# Patient Record
Sex: MALE | Race: WHITE
[De-identification: names, ages, dates, MRNs, and addresses within clinical notes are randomized per-mention and may not be internally consistent; named-entity substitution may affect disease eponyms.]

---

## 2019-06-06 ENCOUNTER — HOSPITAL ENCOUNTER (EMERGENCY)
Dept: HOSPITAL 54 - ER | Age: 34
Discharge: HOME | End: 2019-06-06
Payer: SELF-PAY

## 2019-06-06 VITALS — WEIGHT: 205 LBS | HEIGHT: 74 IN | BODY MASS INDEX: 26.31 KG/M2

## 2019-06-06 VITALS — DIASTOLIC BLOOD PRESSURE: 66 MMHG | SYSTOLIC BLOOD PRESSURE: 127 MMHG
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
## 2019-06-06 DIAGNOSIS — T40.1X1A: Primary | ICD-10-CM

## 2019-09-18 ENCOUNTER — HOSPITAL ENCOUNTER (EMERGENCY)
Dept: HOSPITAL 54 - ER | Age: 34
Discharge: HOME | End: 2019-09-18
Payer: SELF-PAY

## 2019-09-18 VITALS — HEIGHT: 74 IN | BODY MASS INDEX: 26.31 KG/M2 | WEIGHT: 205 LBS

## 2019-09-18 VITALS — SYSTOLIC BLOOD PRESSURE: 140 MMHG | DIASTOLIC BLOOD PRESSURE: 58 MMHG

## 2019-09-18 DIAGNOSIS — T41.295A: Primary | ICD-10-CM
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
## 2019-09-18 LAB
ALBUMIN SERPL BCP-MCNC: 4.5 G/DL (ref 3.4–5)
ALP SERPL-CCNC: 84 U/L (ref 46–116)
ALT SERPL W P-5'-P-CCNC: 22 U/L (ref 12–78)
APAP SERPL-MCNC: 0 UG/ML (ref 10–30)
AST SERPL W P-5'-P-CCNC: 16 U/L (ref 15–37)
BASOPHILS # BLD AUTO: 0.1 /CMM (ref 0–0.2)
BASOPHILS NFR BLD AUTO: 0.6 % (ref 0–2)
BILIRUB DIRECT SERPL-MCNC: 0.1 MG/DL (ref 0–0.2)
BILIRUB SERPL-MCNC: 0.3 MG/DL (ref 0.2–1)
BUN SERPL-MCNC: 13 MG/DL (ref 7–18)
CALCIUM SERPL-MCNC: 9.9 MG/DL (ref 8.5–10.1)
CHLORIDE SERPL-SCNC: 103 MMOL/L (ref 98–107)
CO2 SERPL-SCNC: 30 MMOL/L (ref 21–32)
CREAT SERPL-MCNC: 0.9 MG/DL (ref 0.6–1.3)
EOSINOPHIL NFR BLD AUTO: 1.9 % (ref 0–6)
ETHANOL SERPL-MCNC: < 3 MG/DL (ref 0–0)
GLUCOSE SERPL-MCNC: 97 MG/DL (ref 74–106)
HCT VFR BLD AUTO: 45 % (ref 39–51)
HGB BLD-MCNC: 15.2 G/DL (ref 13.5–17.5)
LYMPHOCYTES NFR BLD AUTO: 16.2 % (ref 20–44)
LYMPHOCYTES NFR BLD AUTO: 2 /CMM (ref 0.8–4.8)
MCHC RBC AUTO-ENTMCNC: 34 G/DL (ref 31–36)
MCV RBC AUTO: 87 FL (ref 80–96)
MONOCYTES NFR BLD AUTO: 0.9 /CMM (ref 0.1–1.3)
MONOCYTES NFR BLD AUTO: 7.2 % (ref 2–12)
NEUTROPHILS # BLD AUTO: 9.1 /CMM (ref 1.8–8.9)
NEUTROPHILS NFR BLD AUTO: 74.1 % (ref 43–81)
PLATELET # BLD AUTO: 243 /CMM (ref 150–450)
POTASSIUM SERPL-SCNC: 5 MMOL/L (ref 3.5–5.1)
PROT SERPL-MCNC: 8.4 G/DL (ref 6.4–8.2)
RBC # BLD AUTO: 5.13 MIL/UL (ref 4.5–6)
SALICYLATES SERPL-MCNC: 1.9 MG/DL (ref 2.8–20)
SODIUM SERPL-SCNC: 141 MMOL/L (ref 136–145)
WBC NRBC COR # BLD AUTO: 12.3 K/UL (ref 4.3–11)

## 2019-09-18 PROCEDURE — 80305 DRUG TEST PRSMV DIR OPT OBS: CPT

## 2019-09-18 PROCEDURE — 36415 COLL VENOUS BLD VENIPUNCTURE: CPT

## 2019-09-18 PROCEDURE — 80076 HEPATIC FUNCTION PANEL: CPT

## 2019-09-18 PROCEDURE — 80329 ANALGESICS NON-OPIOID 1 OR 2: CPT

## 2019-09-18 PROCEDURE — 99283 EMERGENCY DEPT VISIT LOW MDM: CPT

## 2019-09-18 PROCEDURE — 85025 COMPLETE CBC W/AUTO DIFF WBC: CPT

## 2019-09-18 PROCEDURE — G0480 DRUG TEST DEF 1-7 CLASSES: HCPCS

## 2019-09-18 PROCEDURE — 80048 BASIC METABOLIC PNL TOTAL CA: CPT

## 2019-09-18 PROCEDURE — 80307 DRUG TEST PRSMV CHEM ANLYZR: CPT

## 2019-09-18 NOTE — NUR
SOURAV FROM HOME. TO ER BED 10. LETHARGIC, DROWSY BUT EASILY ARROUSABLE WITH 
VERBAL STIMULI. BROUGHT IN FOR OVERDOSE/INGESTION OF GHB. PT REPORTS THAT HE 
TOOK 2 DOSE. SISTER AT BEDSIDE  AND REPORTS THAT HE TOOK COCOAINE AND GHB. PT 
IS PRESENTED BRADYCARDIC IN THE 40S. DEEP SLOW BREATHING WITH SAT FLACTUATING 
WHEN FALLING ASLEEP. PT IS CURRENTLY ON 6LPM O2 VIA SIMPLE MASK SATTING @97%. 
FAMILY AT BEDSIDE HELPING TO KEEP PATIENT AWAKE AND BREATHING. MD WAS AT 
BEDSIDE FOR EVAL. ORDERS RECEIVED, NOTEE AND CARRIED OUT. PT CAME IN WITH IV 
LINE ON R AC 18. BLOOD RAWN AND SENT TO LAB.